# Patient Record
Sex: MALE | Race: WHITE | NOT HISPANIC OR LATINO | Employment: FULL TIME | URBAN - NONMETROPOLITAN AREA
[De-identification: names, ages, dates, MRNs, and addresses within clinical notes are randomized per-mention and may not be internally consistent; named-entity substitution may affect disease eponyms.]

---

## 2020-05-26 ENCOUNTER — APPOINTMENT (OUTPATIENT)
Dept: CT IMAGING | Facility: HOSPITAL | Age: 60
End: 2020-05-26

## 2020-05-26 ENCOUNTER — APPOINTMENT (OUTPATIENT)
Dept: CARDIOLOGY | Facility: HOSPITAL | Age: 60
End: 2020-05-26

## 2020-05-26 ENCOUNTER — HOSPITAL ENCOUNTER (EMERGENCY)
Facility: HOSPITAL | Age: 60
Discharge: HOME | End: 2020-05-26
Admitting: EMERGENCY MEDICINE

## 2020-05-26 ENCOUNTER — APPOINTMENT (OUTPATIENT)
Dept: GENERAL RADIOLOGY | Facility: HOSPITAL | Age: 60
End: 2020-05-26

## 2020-05-26 VITALS
SYSTOLIC BLOOD PRESSURE: 154 MMHG | WEIGHT: 167.99 LBS | OXYGEN SATURATION: 98 % | TEMPERATURE: 98 F | DIASTOLIC BLOOD PRESSURE: 86 MMHG | BODY MASS INDEX: 23.52 KG/M2 | HEIGHT: 71 IN | RESPIRATION RATE: 16 BRPM | HEART RATE: 62 BPM

## 2020-05-26 DIAGNOSIS — R93.89 ABNORMAL CT OF THE CHEST: ICD-10-CM

## 2020-05-26 DIAGNOSIS — R07.89 CHEST WALL PAIN: Primary | ICD-10-CM

## 2020-05-26 DIAGNOSIS — N28.89 NODULE OF KIDNEY: ICD-10-CM

## 2020-05-26 DIAGNOSIS — R93.7 ABNORMAL X-RAY OF BONE: ICD-10-CM

## 2020-05-26 DIAGNOSIS — E04.1 THYROID NODULE: ICD-10-CM

## 2020-05-26 LAB
ALBUMIN SERPL-MCNC: 4.8 G/DL (ref 3.5–5.2)
ALBUMIN/GLOB SERPL: 1.7 G/DL
ALP SERPL-CCNC: 86 U/L (ref 39–117)
ALT SERPL W P-5'-P-CCNC: 19 U/L (ref 1–41)
ANION GAP SERPL CALCULATED.3IONS-SCNC: 12 MMOL/L (ref 5–15)
AST SERPL-CCNC: 37 U/L (ref 1–40)
BASOPHILS # BLD AUTO: 0.08 10*3/MM3 (ref 0–0.2)
BASOPHILS NFR BLD AUTO: 0.7 % (ref 0–1.5)
BH CV STRESS BP STAGE 1: NORMAL
BH CV STRESS BP STAGE 2: NORMAL
BH CV STRESS DURATION MIN STAGE 1: 3
BH CV STRESS DURATION MIN STAGE 2: 2
BH CV STRESS DURATION SEC STAGE 1: 0
BH CV STRESS DURATION SEC STAGE 2: 25
BH CV STRESS ECHO POST STRESS EJECTION FRACTION EF: 65 %
BH CV STRESS GRADE STAGE 1: 10
BH CV STRESS GRADE STAGE 2: 12
BH CV STRESS HR STAGE 1: 113
BH CV STRESS HR STAGE 2: 142
BH CV STRESS METS STAGE 1: 5
BH CV STRESS METS STAGE 2: 7.5
BH CV STRESS PROTOCOL 1: NORMAL
BH CV STRESS RECOVERY BP: NORMAL MMHG
BH CV STRESS RECOVERY HR: 93 BPM
BH CV STRESS SPEED STAGE 1: 1.7
BH CV STRESS SPEED STAGE 2: 2.5
BH CV STRESS STAGE 1: 1
BH CV STRESS STAGE 2: 2
BILIRUB SERPL-MCNC: 0.9 MG/DL (ref 0.2–1.2)
BUN BLD-MCNC: 12 MG/DL (ref 6–20)
BUN/CREAT SERPL: 14.5 (ref 7–25)
CALCIUM SPEC-SCNC: 9.3 MG/DL (ref 8.6–10.5)
CHLORIDE SERPL-SCNC: 100 MMOL/L (ref 98–107)
CO2 SERPL-SCNC: 25 MMOL/L (ref 22–29)
CREAT BLD-MCNC: 0.83 MG/DL (ref 0.76–1.27)
D DIMER PPP FEU-MCNC: 1.78 MG/L (FEU) (ref 0–0.5)
DEPRECATED RDW RBC AUTO: 51.4 FL (ref 37–54)
EOSINOPHIL # BLD AUTO: 0.32 10*3/MM3 (ref 0–0.4)
EOSINOPHIL NFR BLD AUTO: 2.9 % (ref 0.3–6.2)
ERYTHROCYTE [DISTWIDTH] IN BLOOD BY AUTOMATED COUNT: 14.1 % (ref 12.3–15.4)
GFR SERPL CREATININE-BSD FRML MDRD: 95 ML/MIN/1.73
GLOBULIN UR ELPH-MCNC: 2.8 GM/DL
GLUCOSE BLD-MCNC: 121 MG/DL (ref 65–99)
HCT VFR BLD AUTO: 42.2 % (ref 37.5–51)
HGB BLD-MCNC: 15 G/DL (ref 13–17.7)
HOLD SPECIMEN: NORMAL
HOLD SPECIMEN: NORMAL
IMM GRANULOCYTES # BLD AUTO: 0.03 10*3/MM3 (ref 0–0.05)
IMM GRANULOCYTES NFR BLD AUTO: 0.3 % (ref 0–0.5)
LV EF 2D ECHO EST: 55 %
LYMPHOCYTES # BLD AUTO: 1.77 10*3/MM3 (ref 0.7–3.1)
LYMPHOCYTES NFR BLD AUTO: 15.9 % (ref 19.6–45.3)
MAXIMAL PREDICTED HEART RATE: 162 BPM
MCH RBC QN AUTO: 35.5 PG (ref 26.6–33)
MCHC RBC AUTO-ENTMCNC: 35.5 G/DL (ref 31.5–35.7)
MCV RBC AUTO: 100 FL (ref 79–97)
MONOCYTES # BLD AUTO: 1.12 10*3/MM3 (ref 0.1–0.9)
MONOCYTES NFR BLD AUTO: 10 % (ref 5–12)
NEUTROPHILS # BLD AUTO: 7.83 10*3/MM3 (ref 1.7–7)
NEUTROPHILS NFR BLD AUTO: 70.2 % (ref 42.7–76)
NRBC BLD AUTO-RTO: 0 /100 WBC (ref 0–0.2)
NT-PROBNP SERPL-MCNC: 100 PG/ML (ref 5–900)
PERCENT MAX PREDICTED HR: 87.65 %
PLATELET # BLD AUTO: 303 10*3/MM3 (ref 140–450)
PMV BLD AUTO: 9.8 FL (ref 6–12)
POTASSIUM BLD-SCNC: 4.8 MMOL/L (ref 3.5–5.2)
PROT SERPL-MCNC: 7.6 G/DL (ref 6–8.5)
RBC # BLD AUTO: 4.22 10*6/MM3 (ref 4.14–5.8)
SODIUM BLD-SCNC: 137 MMOL/L (ref 136–145)
STRESS BASELINE BP: NORMAL MMHG
STRESS BASELINE HR: 81 BPM
STRESS PERCENT HR: 103 %
STRESS POST ESTIMATED WORKLOAD: 7.5 METS
STRESS POST EXERCISE DUR MIN: 5 MIN
STRESS POST EXERCISE DUR SEC: 25 SEC
STRESS POST PEAK BP: NORMAL MMHG
STRESS POST PEAK HR: 142 BPM
STRESS TARGET HR: 138 BPM
TROPONIN T SERPL-MCNC: <0.01 NG/ML (ref 0–0.03)
TROPONIN T SERPL-MCNC: <0.01 NG/ML (ref 0–0.03)
WBC NRBC COR # BLD: 11.15 10*3/MM3 (ref 3.4–10.8)
WHOLE BLOOD HOLD SPECIMEN: NORMAL
WHOLE BLOOD HOLD SPECIMEN: NORMAL

## 2020-05-26 PROCEDURE — 93350 STRESS TTE ONLY: CPT

## 2020-05-26 PROCEDURE — 96375 TX/PRO/DX INJ NEW DRUG ADDON: CPT

## 2020-05-26 PROCEDURE — 93010 ELECTROCARDIOGRAM REPORT: CPT | Performed by: INTERNAL MEDICINE

## 2020-05-26 PROCEDURE — 93352 ADMIN ECG CONTRAST AGENT: CPT | Performed by: INTERNAL MEDICINE

## 2020-05-26 PROCEDURE — 25010000002 MORPHINE PER 10 MG: Performed by: EMERGENCY MEDICINE

## 2020-05-26 PROCEDURE — 25010000002 ONDANSETRON PER 1 MG: Performed by: EMERGENCY MEDICINE

## 2020-05-26 PROCEDURE — 71045 X-RAY EXAM CHEST 1 VIEW: CPT

## 2020-05-26 PROCEDURE — 25010000002 PERFLUTREN 6.52 MG/ML SUSPENSION: Performed by: INTERNAL MEDICINE

## 2020-05-26 PROCEDURE — 84484 ASSAY OF TROPONIN QUANT: CPT | Performed by: EMERGENCY MEDICINE

## 2020-05-26 PROCEDURE — 0 IOPAMIDOL PER 1 ML: Performed by: PHYSICIAN ASSISTANT

## 2020-05-26 PROCEDURE — 25010000002 ONDANSETRON PER 1 MG: Performed by: PHYSICIAN ASSISTANT

## 2020-05-26 PROCEDURE — 93018 CV STRESS TEST I&R ONLY: CPT | Performed by: INTERNAL MEDICINE

## 2020-05-26 PROCEDURE — 93005 ELECTROCARDIOGRAM TRACING: CPT | Performed by: EMERGENCY MEDICINE

## 2020-05-26 PROCEDURE — 96374 THER/PROPH/DIAG INJ IV PUSH: CPT

## 2020-05-26 PROCEDURE — 93350 STRESS TTE ONLY: CPT | Performed by: INTERNAL MEDICINE

## 2020-05-26 PROCEDURE — 83880 ASSAY OF NATRIURETIC PEPTIDE: CPT | Performed by: PHYSICIAN ASSISTANT

## 2020-05-26 PROCEDURE — 25010000002 MORPHINE PER 10 MG: Performed by: PHYSICIAN ASSISTANT

## 2020-05-26 PROCEDURE — 80053 COMPREHEN METABOLIC PANEL: CPT | Performed by: PHYSICIAN ASSISTANT

## 2020-05-26 PROCEDURE — 85025 COMPLETE CBC W/AUTO DIFF WBC: CPT | Performed by: PHYSICIAN ASSISTANT

## 2020-05-26 PROCEDURE — 96376 TX/PRO/DX INJ SAME DRUG ADON: CPT

## 2020-05-26 PROCEDURE — 73010 X-RAY EXAM OF SHOULDER BLADE: CPT

## 2020-05-26 PROCEDURE — 93017 CV STRESS TEST TRACING ONLY: CPT

## 2020-05-26 PROCEDURE — 85379 FIBRIN DEGRADATION QUANT: CPT | Performed by: PHYSICIAN ASSISTANT

## 2020-05-26 PROCEDURE — 71275 CT ANGIOGRAPHY CHEST: CPT

## 2020-05-26 PROCEDURE — 99284 EMERGENCY DEPT VISIT MOD MDM: CPT

## 2020-05-26 RX ORDER — DOBUTAMINE HYDROCHLORIDE 100 MG/100ML
10-50 INJECTION INTRAVENOUS CONTINUOUS
Status: DISCONTINUED | OUTPATIENT
Start: 2020-05-26 | End: 2020-05-26 | Stop reason: HOSPADM

## 2020-05-26 RX ORDER — ONDANSETRON 2 MG/ML
4 INJECTION INTRAMUSCULAR; INTRAVENOUS ONCE
Status: COMPLETED | OUTPATIENT
Start: 2020-05-26 | End: 2020-05-26

## 2020-05-26 RX ORDER — KETOROLAC TROMETHAMINE 10 MG/1
10 TABLET, FILM COATED ORAL EVERY 6 HOURS PRN
Qty: 9 TABLET | Refills: 0 | Status: SHIPPED | OUTPATIENT
Start: 2020-05-26

## 2020-05-26 RX ORDER — SODIUM CHLORIDE 0.9 % (FLUSH) 0.9 %
10 SYRINGE (ML) INJECTION AS NEEDED
Status: DISCONTINUED | OUTPATIENT
Start: 2020-05-26 | End: 2020-05-26 | Stop reason: HOSPADM

## 2020-05-26 RX ADMIN — MORPHINE SULFATE 4 MG: 4 INJECTION, SOLUTION INTRAMUSCULAR; INTRAVENOUS at 08:54

## 2020-05-26 RX ADMIN — PERFLUTREN 8.48 MG: 6.52 INJECTION, SUSPENSION INTRAVENOUS at 12:57

## 2020-05-26 RX ADMIN — MORPHINE SULFATE 4 MG: 4 INJECTION, SOLUTION INTRAMUSCULAR; INTRAVENOUS at 13:56

## 2020-05-26 RX ADMIN — ONDANSETRON HYDROCHLORIDE 4 MG: 2 SOLUTION INTRAMUSCULAR; INTRAVENOUS at 13:56

## 2020-05-26 RX ADMIN — ONDANSETRON HYDROCHLORIDE 4 MG: 2 SOLUTION INTRAMUSCULAR; INTRAVENOUS at 08:54

## 2020-05-26 RX ADMIN — IOPAMIDOL 125 ML: 755 INJECTION, SOLUTION INTRAVENOUS at 09:32

## 2020-05-26 NOTE — ED PROVIDER NOTES
Subjective   History of Present Illness    Patient is a pleasant 59-year-old male chief complaint of severe left-sided chest wall pain.  The patient describes that he was just putting a pin in his 5 foot bushhog yesterday when he suddenly felt this discomfort as he points to his left lower anterior chest wall.  The patient reports it was somewhat painful at first with a hard time taking deep inspiration and it progressively worsened to the point he was lying on the ground.  His neighbor found him last night and given him aspirin.  HE had also taken Philipp.  They situated him inside the house and called 911.  EMS arrived and the patient absolutely refused any further medical evaluation.  He wanted to see if he improved this morning.  The pain worsened this morning and he eventually came to the ER to be further evaluated.  The patient reports the pain is worse with any movement or palpation.  Is hard for him to get deep breath.  He denies any further trauma.  He denies any hemoptysis.  He denies experiencing nights before.  Denies any rash.  He denied participating more exertional than per usual.  He does have a personal history of tobacco abuse but does not seek medical attention on a regular basis so he is unsure if he has any history of hypertension or hypercholesterolemia.  He denies any known lung or cardiac issues.    Review of Systems   Constitutional: Positive for activity change.   HENT: Negative.    Eyes: Negative.    Respiratory: Positive for shortness of breath. Negative for chest tightness.    Cardiovascular: Positive for chest pain.   Gastrointestinal: Negative.    Genitourinary: Negative.    Musculoskeletal: Negative.    Skin: Negative.    Neurological: Negative.    Psychiatric/Behavioral: Negative.    All other systems reviewed and are negative.      Past Medical History:   Diagnosis Date   • Patient denies medical problems        Allergies   Allergen Reactions   • Penicillins Other (See Comments)      "UNKNOWN       Past Surgical History:   Procedure Laterality Date   • APPENDECTOMY         History reviewed. No pertinent family history.    Social History     Socioeconomic History   • Marital status: Significant Other     Spouse name: Not on file   • Number of children: Not on file   • Years of education: Not on file   • Highest education level: Not on file   Tobacco Use   • Smoking status: Current Every Day Smoker     Packs/day: 1.50   Substance and Sexual Activity   • Alcohol use: Yes     Comment: BEER DAILY 4-5 CANS   • Drug use: Not Currently       Prior to Admission medications    Not on File       Medications   sodium chloride 0.9 % flush 10 mL (has no administration in time range)   DOBUTamine (DOBUTREX) 100 mg in 100mL D5W (1 mg/ml) infusion (ECHO) (has no administration in time range)   morphine injection 4 mg (4 mg Intravenous Given 5/26/20 0854)   ondansetron (ZOFRAN) injection 4 mg (4 mg Intravenous Given 5/26/20 0854)   iopamidol (ISOVUE-370) 76 % injection 125 mL (125 mL Intravenous Given 5/26/20 0932)   perflutren (DEFINITY) lipid microspheres injection 8.476 mg (8.476 mg Intravenous Given 5/26/20 1257)   morphine injection 4 mg (4 mg Intravenous Given 5/26/20 1356)   ondansetron (ZOFRAN) injection 4 mg (4 mg Intravenous Given 5/26/20 1356)       /79   Pulse 64   Temp 98 °F (36.7 °C)   Resp 18   Ht 180.3 cm (70.98\")   Wt 76.2 kg (167 lb 15.9 oz)   SpO2 98%   BMI 23.44 kg/m²       Objective   Physical Exam   Constitutional: He is oriented to person, place, and time. He appears well-developed and well-nourished.   HENT:   Head: Normocephalic and atraumatic.   Right Ear: External ear normal.   Left Ear: External ear normal.   Nose: Nose normal.   Mouth/Throat: Oropharynx is clear and moist.   Eyes: Pupils are equal, round, and reactive to light. Conjunctivae and EOM are normal.   Neck: Normal range of motion. Neck supple. No tracheal deviation present.   Cardiovascular: Normal rate, regular " rhythm, normal heart sounds and intact distal pulses. Exam reveals no gallop and no friction rub.   No murmur heard.  Pulmonary/Chest: Effort normal and breath sounds normal. No respiratory distress. He has no wheezes. He has no rales. He exhibits tenderness and bony tenderness. He exhibits no mass, no laceration, no crepitus, no edema, no deformity, no swelling and no retraction.   Patient is markedly tender palpation of the left lower anterior chest wall without any ecchymosis, subcutaneous emphysema, or crepitus.  He has mild tenderness to left lower lateral chest wall.  No tenderness elsewhere.  No rash.  No signs of trauma.   Abdominal: Soft. Bowel sounds are normal. He exhibits no distension and no mass. There is no tenderness. There is no rebound and no guarding.   Musculoskeletal: Normal range of motion. He exhibits no edema, tenderness or deformity.   Neurological: He is alert and oriented to person, place, and time. He has normal reflexes. He exhibits normal muscle tone. Coordination normal.   Skin: Skin is warm and dry. Capillary refill takes less than 2 seconds. No rash noted. No erythema. No pallor.   Psychiatric: He has a normal mood and affect. His behavior is normal. Judgment and thought content normal.   Vitals reviewed.      Procedures         Lab Results (last 24 hours)     Procedure Component Value Units Date/Time    Troponin [[id number]]  (Normal) Collected:  05/26/20 0845    Specimen:  Blood Updated:  05/26/20 0919     Troponin T <0.010 ng/mL     Narrative:       Troponin T Reference Range:  <= 0.03 ng/mL-   Negative for AMI  >0.03 ng/mL-     Abnormal for myocardial necrosis.  Clinicians would have to utilize clinical acumen, EKG, Troponin and serial changes to determine if it is an Acute Myocardial Infarction or myocardial injury due to an underlying chronic condition.       Results may be falsely decreased if patient taking Biotin.      BNP [[id number]]  (Normal) Collected:  05/26/20 0845     Specimen:  Blood Updated:  05/26/20 0907     proBNP 100.0 pg/mL     Narrative:       Among patients with dyspnea, NT-proBNP is highly sensitive for the detection of acute congestive heart failure. In addition NT-proBNP of <300 pg/ml effectively rules out acute congestive heart failure with 99% negative predictive value.    Results may be falsely decreased if patient taking Biotin.      CBC Auto Differential [[id number]]  (Abnormal) Collected:  05/26/20 0845    Specimen:  Blood Updated:  05/26/20 0852     WBC 11.15 10*3/mm3      RBC 4.22 10*6/mm3      Hemoglobin 15.0 g/dL      Hematocrit 42.2 %      .0 fL      MCH 35.5 pg      MCHC 35.5 g/dL      RDW 14.1 %      RDW-SD 51.4 fl      MPV 9.8 fL      Platelets 303 10*3/mm3      Neutrophil % 70.2 %      Lymphocyte % 15.9 %      Monocyte % 10.0 %      Eosinophil % 2.9 %      Basophil % 0.7 %      Immature Grans % 0.3 %      Neutrophils, Absolute 7.83 10*3/mm3      Lymphocytes, Absolute 1.77 10*3/mm3      Monocytes, Absolute 1.12 10*3/mm3      Eosinophils, Absolute 0.32 10*3/mm3      Basophils, Absolute 0.08 10*3/mm3      Immature Grans, Absolute 0.03 10*3/mm3      nRBC 0.0 /100 WBC     Comprehensive Metabolic Panel [[id number]]  (Abnormal) Collected:  05/26/20 0845    Specimen:  Blood Updated:  05/26/20 0911     Glucose 121 mg/dL      BUN 12 mg/dL      Creatinine 0.83 mg/dL      Sodium 137 mmol/L      Potassium 4.8 mmol/L      Chloride 100 mmol/L      CO2 25.0 mmol/L      Calcium 9.3 mg/dL      Total Protein 7.6 g/dL      Albumin 4.80 g/dL      ALT (SGPT) 19 U/L      AST (SGOT) 37 U/L      Alkaline Phosphatase 86 U/L      Total Bilirubin 0.9 mg/dL      eGFR Non African Amer 95 mL/min/1.73      Globulin 2.8 gm/dL      A/G Ratio 1.7 g/dL      BUN/Creatinine Ratio 14.5     Anion Gap 12.0 mmol/L     Narrative:       GFR Normal >60  Chronic Kidney Disease <60  Kidney Failure <15      D-dimer, Quantitative [[id number]]  (Abnormal) Collected:  05/26/20 0845    Specimen:   Blood Updated:  05/26/20 0903     D-Dimer, Quantitative 1.78 mg/L (FEU)     Narrative:       Reference Range is 0-0.50 mg/L FEU. However, results <0.50 mg/L FEU tends to rule out DVT or PE. Results >0.50 mg/L FEU are not useful in predicting absence or presence of DVT or PE.      Troponin [[id number]]  (Normal) Collected:  05/26/20 1157    Specimen:  Blood Updated:  05/26/20 1222     Troponin T <0.010 ng/mL     Narrative:       Troponin T Reference Range:  <= 0.03 ng/mL-   Negative for AMI  >0.03 ng/mL-     Abnormal for myocardial necrosis.  Clinicians would have to utilize clinical acumen, EKG, Troponin and serial changes to determine if it is an Acute Myocardial Infarction or myocardial injury due to an underlying chronic condition.       Results may be falsely decreased if patient taking Biotin.            Xr Scapula Left    Result Date: 5/26/2020  Narrative: EXAMINATION: Left scapula 05/26/2020  HISTORY: Abnormal chest x-ray  FINDINGS: Two-view exam of the scapula does confirm a lytic lesion involving the inferior scapula adjacent lower margin of the bony glenoid. Although some of the margins are thinly sclerotic there are portions of this lesion which have ill-defined margins. A lytic metastasis is not excluded and this has a more suspicious appearance based on plain film radiographs. Arthrosis is noted of the AC joint. There is arthritic change of the inferior bony glenoid including subchondral cyst formation.      Impression: 1. Lytic lesion with some portions of the lesion having ill-defined margins. There is subtle periosteal reaction noted along the lower margin of the lesion. Radiographically this lesion is indeterminate and has more aggressive characteristics. If there is no known history of neoplasm then I would suggest follow-up with a bone scan to evaluate for any other potential lesions. Eosinophilic granuloma or fibrous dysplasia should also be considered. This report was finalized on 05/26/2020  09:36 by Dr. Parminder Carreon MD.    Xr Chest 1 View    Result Date: 5/26/2020  Narrative: EXAMINATION: XR CHEST 1 VW-. 5/26/2020 8:53 AM CDT  CHEST, ONE VIEW:  HISTORY: Left rib pain  COMPARISON: None  A single frontal chest radiograph was obtained.  FINDINGS:  There is a apparent lytic lesion involving the left scapula, inferior to the glenoid laterally. Correlate with clinical findings. Further imaging characterization with scapula radiographs suggested initially.  The lungs are clear without parenchymal infiltrates.  The heart is normal in size, pulmonary circulation appropriate, without heart failure.  No additional acute bony abnormality observed.                                    Impression: 1. Lytic lesion suspected in the left scapula, along the inferior aspect of the bony glenoid laterally. Further imaging characterization recommended, initially scapula radiographs. 2. Otherwise, no acute cardiopulmonary process.  This report was finalized on 05/26/2020 08:59 by Dr. Umang Martinez MD.    Ct Angiogram Chest    Result Date: 5/26/2020  Narrative: EXAMINATION: CT ANGIOGRAM CHEST- 5/26/2020 9:52 AM CDT  HISTORY: Severe chest pain and shortness of breath  COMPARISON: Chest x-ray 5/26/2020  DOSE: 273 mGy-cm  TECHNIQUE: Sequential imaging was performed from the thoracic inlet through the upper abdomen following the administration of IV contrast. Sagittal and coronal reformations were made from the original source data and reviewed. Additionally, 3-D MIPS reconstructions of the vessels were made per CTA protocol. Automated exposure control was also utilized to decrease patient radiation dose.  FINDINGS: A hypodense nodule is seen in the right thyroid lobe which measures 12 mm in size. Thyroid gland is otherwise grossly normal in appearance. The trachea is widely patent. There is some debris posteriorly, presumably mucus deposition. The esophagus is grossly normal in appearance.  No pathologically enlarged axillary,  "mediastinal, or hilar lymph nodes are identified.  The heart appears normal in size. Coronary artery calcifications are present. Atherosclerotic ossifications are also seen within the aorta and its branch vessels. The main pulmonary artery appears normal in caliber. There is adequate opacification of the pulmonary arteries. No filling defects are identified to suggest PE.  There are dependent changes in the lung bases. There are minimal bullous changes in the right lung apex. The lungs otherwise appear clear.  Review of the visualized portion of the upper abdomen demonstrates no acute abnormalities. A low density lesion is seen in the left kidney which is incompletely characterized on this exam.  Review of the visualized osseous structures demonstrates no acute or aggressive lesions. Degenerative changes are noted in the spine. There is a lucent area in the left scapula which demonstrate sharply demarcated margins with a nonaggressive appearance.      Impression: 1. No evidence of PE or acute aortic pathology.  2. Atherosclerosis of the aorta and coronary arteries.    This report was finalized on 2020 10:05 by Dr. Elvin Phillips MD.    Robert Hicks   Stress Echocardiogram   Order# [id number]   Reading physician: Bryan Navarrete MD Ordering physician: Audrey Vera PA Study date: 20   Patient Information     Patient Name  Robert Hicks MRN  [id number] Sex  Male  (Age)  1960 (59 y.o.)   Admission Information     Admission Date/Time Discharge Date/Time Room/Bed   20  0832     Patient Hx Of Height, Weight, and Vitals     Height Weight BSA (Calculated - sq m) BMI (kg/m2) Pulse BP   180.3 cm (70.98\") 76.2 kg (167 lb 15.9 oz) 1.96 sq meters 23.49 64 166/80   Patient Vitals     BP Pulse   166/80 64   Reason for Exam     Chest Pain; Acute Chest Pain   Cardiac History     No past medical history on file.   Social History     Tobacco Use     Current Every Day Smoker; Smokes 1.5 packs/day. "   Family Cardiac History as of 5/26/2020     No family history on file.   Interpretation Summary        · Estimated EF = 55%.  · Left ventricular systolic function is normal.  · Low risk stress test for stress induced myocardial ischemia          ED Course  ED Course as of May 26 1419   Tue May 26, 2020   1101 Patient has been reassessed.  He has been educated normal findings seen on the chest x-ray and the CTA of his chest.  He does have a lytic lesion in the left scapula that is well demarcated with a nonaggressive appearance as read by radiologist.  He also has evidence of a right thyroid lobe nodule and a nodule in the left kidney that not was not completely visualized.  He has been educated of the differentials and the test results thus far.  His family member was in the room as well as his sister was on the phone.  He understands what is pending as well.  His pain has improved after morphine.  He is more comfortable.    [TK]   1418 Patient has been educated of the negative stress tests. Differential discussed at length. He is to monitor for any development of rash that  could indicate shingles or any acute worsening issue.  Pt voices understanding and will be dcd in stable condition.     [TK]      ED Course User Index  [TK] Audrey Vera PA         HEART Score (for prediction of 6-week risk of major adverse cardiac event) reviewed and/or performed as part of the patient evaluation and treatment planning process.  The result associated with this review/performance is: 3      MDM  Number of Diagnoses or Management Options  Diagnosis management comments: Differential Diagnosis:  I considered chest wall pain, muscle strain, costochondritis, pleurisy, rib fracture, herpes zoster, cardiovascular etiology, myocardial infarction, intermediate coronary syndrome, unstable angina, angina, aortic dissection, pericarditis, pulmonary etiology, pulmonary embolism, pneumonia, pneumothorax, lung cancer,  gastroesophageal reflux disease, esophagitis, esophageal spasm and gastrointestinal etiology as a possible cause of chest pain in this patient. This is a partial list of diagnoses considered.        Final diagnoses:   Chest wall pain   Abnormal CT of the chest   Abnormal x-ray of bone   Thyroid nodule   Nodule of kidney            Audrey Vera PA  05/26/20 8614

## 2020-05-26 NOTE — DISCHARGE INSTRUCTIONS
Follow up with one of the [facility] physician groups below to setup primary care. If you have trouble making an appointment, please call the [facility] Nurse Line at [phone]    Dr. Marilyn Reynoso DO, Dr. Gayle Barreto DO, and KAYDEN Dominguez  [facility]  [address] [place] [address], [id number] [phone]    Dr. Benoit Maxwell MD  [facility] - [address] [address], [address], [place], [place] [id number] [phone]    Dr. Luis Montalvo DO, Dr. Nilo Guardado DO,  KAYDEN Mallory, and KAYDEN Rust  [facility] - [address] [address], [address], [place], [place] [id number] [phone]     Dr. Lisa Jackson MD, and KAYDEN Duval  [facility] [address] [id number] [phone]    Dr. Robert Crawley MD and Dr. Ryan Lira MD  [address], [address] [phone]    Dr. Rito Aguayo MD  [facility]  [address] [place] [address], [address], [place], [place], [address] [phone]    Dr. Yanick Machado MD  [facility]  [address], [place], [address] [phone]                  Nonspecific Chest Pain, Adult  Chest pain can be caused by many different conditions. It can be caused by a condition that is life-threatening and requires treatment right away. It can also be caused by something that is not life-threatening. If you have chest pain, it can be hard to know the difference, so it is important to get help right away to make sure that you do not have a serious condition.  Some life-threatening causes of chest pain include:  · Heart attack.  · A tear in the body's main blood vessel (aortic  dissection).  · Inflammation around your heart (pericarditis).  · A problem in the lungs, such as a blood clot (pulmonary embolism) or a collapsed lung (pneumothorax).  Some non life-threatening causes of chest pain include:  · Heartburn.  · Anxiety or stress.  · Damage to the bones, muscles, and cartilage that make up your chest wall.  · Pneumonia or bronchitis.  · Shingles infection (varicella-zoster virus).  Chest pain can feel like:  · Pain or discomfort on the surface of your chest or deep in your chest.  · Crushing, pressure, aching, or squeezing pain.  · Burning or tingling.  · Dull or sharp pain that is worse when you move, cough, or take a deep breath.  · Pain or discomfort that is also felt in your back, neck, jaw, shoulder, or arm, or pain that spreads to any of these areas.  Your chest pain may come and go. It may also be constant. Your health care provider will do lab tests and other studies to find the cause of your pain. Treatment will depend on the cause of your chest pain.  Follow these instructions at home:  Medicines  · Take over-the-counter and prescription medicines only as told by your health care provider.  · If you were prescribed an antibiotic, take it as told by your health care provider. Do not stop taking the antibiotic even if you start to feel better.  Lifestyle    · Rest as directed by your health care provider.  · Do not use any products that contain nicotine or tobacco, such as cigarettes and e-cigarettes. If you need help quitting, ask your health care provider.  · Do not drink alcohol.  · Make healthy lifestyle choices as recommended. These may include:  ? Getting regular exercise. Ask your health care provider to suggest some activities that are safe for you.  ? Eating a heart-healthy diet. This includes plenty of fresh fruits and vegetables, whole grains, low-fat (lean) protein, and low-fat dairy products. A dietitian can help you find healthy eating options.  ? Maintaining a  healthy weight.  ? Managing any other health conditions you have, such as high blood pressure (hypertension) or diabetes.  ? Reducing stress, such as with yoga or relaxation techniques.  General instructions  · Pay attention to any changes in your symptoms. Tell your health care provider about them or any new symptoms.  · Avoid any activities that cause chest pain.  · Keep all follow-up visits as told by your health care provider. This is important. This includes visits for any further testing if your chest pain does not go away.  Contact a health care provider if:  · Your chest pain does not go away.  · You feel depressed.  · You have a fever.  Get help right away if:  · Your chest pain gets worse.  · You have a cough that gets worse, or you cough up blood.  · You have severe pain in your abdomen.  · You faint.  · You have sudden, unexplained chest discomfort.  · You have sudden, unexplained discomfort in your arms, back, neck, or jaw.  · You have shortness of breath at any time.  · You suddenly start to sweat, or your skin gets clammy.  · You feel nausea or you vomit.  · You suddenly feel lightheaded or dizzy.  · You have severe weakness, or unexplained weakness or fatigue.  · Your heart begins to beat quickly, or it feels like it is skipping beats.  These symptoms may represent a serious problem that is an emergency. Do not wait to see if the symptoms will go away. Get medical help right away. Call your local emergency services (911 in the [place] Do not drive yourself to the hospital.  Summary  · Chest pain can be caused by a condition that is serious and requires urgent treatment. It may also be caused by something that is not life-threatening.  · If you have chest pain, it is very important to see your health care provider. Your health care provider may do lab tests and other studies to find the cause of your pain.  · Follow your health care provider's instructions on taking medicines, making lifestyle changes,  and getting emergency treatment if symptoms become worse.  · Keep all follow-up visits as told by your health care provider. This includes visits for any further testing if your chest pain does not go away.  This information is not intended to replace advice given to you by your health care provider. Make sure you discuss any questions you have with your health care provider.  Document Released: 09/27/2006 Document Revised: 06/20/2019 Document Reviewed: 06/20/2019  [employer] Patient Education © 2020 [employer]